# Patient Record
(demographics unavailable — no encounter records)

---

## 2024-10-11 NOTE — HISTORY OF PRESENT ILLNESS
[FreeTextEntry1] : Health maintenance examination [de-identified] : Patient is here for health maintenance examination she feels well in the past she has undergone surveillance for multiple colonic polyps also she had a lumpectomy in her breast which was benign she feels well follows a healthy diet has lost some weight on her own through diet and exercise review of systems is unremarkable

## 2024-10-11 NOTE — HEALTH RISK ASSESSMENT
[Excellent] : ~his/her~ current health as excellent [Very Good] : ~his/her~  mood as very good [Yes] : Yes [2 - 4 times a month (2 pts)] : 2-4 times a month (2 points) [1 or 2 (0 pts)] : 1 or 2 (0 points) [Never (0 pts)] : Never (0 points) [One fall no injury in past year] : Patient reported one fall in the past year without injury [0] : 2) Feeling down, depressed, or hopeless: Not at all (0) [Never] : Never [Patient reported mammogram was normal] : Patient reported mammogram was normal [Patient reported PAP Smear was normal] : Patient reported PAP Smear was normal [None] : None [Alone] : lives alone [Employed] : employed [Single] : single [Feels Safe at Home] : Feels safe at home [Fully functional (bathing, dressing, toileting, transferring, walking, feeding)] : Fully functional (bathing, dressing, toileting, transferring, walking, feeding) [Fully functional (using the telephone, shopping, preparing meals, housekeeping, doing laundry, using] : Fully functional and needs no help or supervision to perform IADLs (using the telephone, shopping, preparing meals, housekeeping, doing laundry, using transportation, managing medications and managing finances) [Smoke Detector] : smoke detector [Carbon Monoxide Detector] : carbon monoxide detector [Seat Belt] :  uses seat belt [Patient/Caregiver not ready to engage] : , patient/caregiver not ready to engage [FreeTextEntry1] : polyps and tumors [de-identified] : none [de-identified] : active [de-identified] : healthy [[id number]] : 0 [EyeExamDate] : 10/24 [Change in mental status noted] : No change in mental status noted [Reports changes in hearing] : Reports no changes in hearing [Reports changes in vision] : Reports no changes in vision [Reports changes in dental health] : Reports no changes in dental health [MammogramDate] : 10/23 [PapSmearDate] : 10/23 [ColonoscopyDate] : 10/23 [ColonoscopyComments] : multiple polyps [AdvancecareDate] : 10/11/24

## 2024-10-11 NOTE — ASSESSMENT
[FreeTextEntry1] : Patient doing well will have routine laboratory work done she is followed up with gynecology and GI no new medications laboratory work will be ordered she is requested a lipoprotein a analysis does not wish to have any immunizations at this time

## 2024-12-10 NOTE — PHYSICAL EXAM
[Chaperone Present] : A chaperone was present in the examining room during all aspects of the physical examination [Appropriately responsive] : appropriately responsive [Alert] : alert [No Acute Distress] : no acute distress [Soft] : soft [Non-tender] : non-tender [No Lesions] : no lesions [Oriented x3] : oriented x3 [Examination Of The Breasts] : a normal appearance [No Discharge] : no discharge [Labia Majora] : normal [Labia Minora] : normal [No Bleeding] : There was no active vaginal bleeding [Normal] : normal [Uterine Adnexae] : non-palpable [Tenderness] : nontender

## 2024-12-10 NOTE — PLAN
[FreeTextEntry1] : Reviewed limitations of thermography  Lifestyle Medicine handouts regarding whole food plant-based diet provided RTO prn or for annual

## 2024-12-10 NOTE — HISTORY OF PRESENT ILLNESS
[FreeTextEntry1] : This 54 year old P 0000  LMP 2/6/24 presents for annual. 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 Jackson, and has decided to seek care with a naturopath and will be completing thermography for breast imaging, pt states " I have decided not to follow conventional medicine";  admits to anxiety at this time of body challenges, but has altered her diet with resultant weight loss and is exercising. Medical hx reviewed and updated, no change to surgical or family hx. [Mammogramdate] : Sept 2023 [TextBox_19] : BIRAD 2 [BreastSonogramDate] : Sept 2023 [PapSmeardate] : Oct 2023 [TextBox_31] : neg pap and HPV [TextBox_37] : per pt June 2023 [ColonoscopyDate] : Nov 2023 [TextBox_43] : see chart